# Patient Record
(demographics unavailable — no encounter records)

---

## 2024-11-13 NOTE — REVIEW OF SYSTEMS
[Leg Claudication (leg pain with exertion)] : intermittent leg claudication [Lower Ext Edema (lower leg swelling)] : lower extremity edema [Heartburn] : heartburn [Melena (black stool)] : melena [Negative] : Heme/Lymph

## 2024-11-15 NOTE — PHYSICAL EXAM
[Alert] : alert [Normal Voice/Communication] : normal voice/communication [Healthy Appearing] : healthy appearing [No Acute Distress] : no acute distress [Sclera] : the sclera and conjunctiva were normal [Normal Appearance] : the appearance of the neck was normal [No Neck Mass] : no neck mass was observed [No Respiratory Distress] : no respiratory distress [Respiration, Rhythm And Depth] : normal respiratory rhythm and effort [Auscultation Breath Sounds / Voice Sounds] : lungs were clear to auscultation bilaterally [Heart Rate And Rhythm] : heart rate was normal and rhythm regular [Normal S1, S2] : normal S1 and S2 [Murmurs] : no murmurs [Bowel Sounds] : normal bowel sounds [Abdomen Tenderness] : non-tender [No Masses] : no abdominal mass palpated [Abdomen Soft] : soft [] : no hepatosplenomegaly [Normal Color / Pigmentation] : normal skin color and pigmentation [de-identified] : AMbulates with a cane

## 2024-11-15 NOTE — PHYSICAL EXAM
[Alert] : alert [Normal Voice/Communication] : normal voice/communication [Healthy Appearing] : healthy appearing [No Acute Distress] : no acute distress [Sclera] : the sclera and conjunctiva were normal [Normal Appearance] : the appearance of the neck was normal [No Neck Mass] : no neck mass was observed [No Respiratory Distress] : no respiratory distress [Respiration, Rhythm And Depth] : normal respiratory rhythm and effort [Auscultation Breath Sounds / Voice Sounds] : lungs were clear to auscultation bilaterally [Heart Rate And Rhythm] : heart rate was normal and rhythm regular [Normal S1, S2] : normal S1 and S2 [Murmurs] : no murmurs [Bowel Sounds] : normal bowel sounds [Abdomen Tenderness] : non-tender [No Masses] : no abdominal mass palpated [Abdomen Soft] : soft [] : no hepatosplenomegaly [Normal Color / Pigmentation] : normal skin color and pigmentation [de-identified] : AMbulates with a cane

## 2024-11-15 NOTE — ASSESSMENT
[FreeTextEntry1] : Anemia, iron deficiency - Prior w/u of her anemia has not revealed a GI cause. Last evaluation was done in 2020 and now she has had recent decline in iron stores resulting in iron infusions. She has responded appropriately to iron infusions. I reviewed the risks and benefits of doing an EGD and colonoscopy at this time. She has a prior history of colon polyps and PUD. She has agreed to have these done, so will schedule for her to have these done. Will need PST prior to these procedures. Golytely prep.

## 2024-11-19 NOTE — ASSESSMENT
[FreeTextEntry1] : Bilateral simple/complex renal cyst, stable on US. Follow up in one year with repeat renal US.

## 2024-11-19 NOTE — HISTORY OF PRESENT ILLNESS
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  Office renal US today: Findings: No recurrence of any masses bilaterally. There is a 3.4 x 2.9 x 3.9cm cyst mid pole and 1.5 x 1.2 x 1.5cm upper pole cyst in the right kidney. Additionally, there is a 1.8 x 1.7 x 2.2cm septate cyst in the lower pole of the right kidney. There is a 2.3 x 2.1 x 2.4cm upper pole and 1.1 x 0.8 x 1cm lower pole cyst in the left kidney. A septate cyst was also seen in the mid pole of the left kidney measuring 1.1 x 1.2 x 1.9cm. All cysts appear nonvascular bilaterally. Both kidneys are normal in size and echogenicity without hydronephrosis, stones or solid masses present.  Last creatinine 1.20 mg/dL, Oct 2024. Urinary function: no hematuria, dysuria.  No incontinence.

## 2025-01-14 NOTE — ASSESSMENT
[FreeTextEntry1] : This is a 72 year old woman with a history of anemia, MGUS, was in hospital for a laminectomy in July 25th. patient hg at baseline prior to surgery was in the high 9's decreased to 7.5g/dol was transfused a unit of PRBC during the surgery.  Since then had never been severely anemic but HG was previously in the 10-11 range, this year more in the 9.5-10 range with a slight decrees. Iron studies remain borderline % sat 22%, unclear how much iron deficiencies she has, appears to have a small element of renal incapacity and anemia of chronic disease.  Discussed possible iron infusions, however at the end of the visit determent that this is less likely to help and patient is asymptomatic therefore we could continue observation Q 6 months.

## 2025-01-14 NOTE — HISTORY OF PRESENT ILLNESS
[de-identified] : Rosaura Huang is a 72 year old woman with BIBI on CPAP, gout and arthritis, type 2 diabetes mellitus, hyperlipidemia, esophageal reflux, hypertension, hypothyroidism, lumbar radiculopathy, spondylolisthesis, who is referred for anemia and H/O PUD. She had EGD and colonoscopy in September 2013 and has H/O PUD and internal hemorrhoids. On 6/6/16, hemoglobin was 9.6 g/dL and ferritin 12.9, L. On 2/2/17, hemoglobin dropped to 8.0 g/dL, platelet count was 433, and iron saturation was 5%. She had erythropoietin level of 68 on 7/20/17. Hemoglobin was 8.7 g/dL on 8/25/17. [de-identified] : Hg9.5g/dl. Patient feeling ok, no weakness no SOB, Hg has been 9-10 for the past year or so, this is just about here  baseline considered IV iron but after some discussion with ehr, the iron deficiency portion of the anemia is rather inconclusive, probably some amount of anemia of chronic disease and renal insufficiency involved so the iron may not work. Can continue to follow rather than intervene right ow, patient can not tolerate oral supplementation.   Patient is still on the B12 for the B12 deficiency, once a day, B12 has been well over 2000 and this had not hanged in years.

## 2025-01-14 NOTE — HISTORY OF PRESENT ILLNESS
[de-identified] : Rosaura Huang is a 72 year old woman with BIBI on CPAP, gout and arthritis, type 2 diabetes mellitus, hyperlipidemia, esophageal reflux, hypertension, hypothyroidism, lumbar radiculopathy, spondylolisthesis, who is referred for anemia and H/O PUD. She had EGD and colonoscopy in September 2013 and has H/O PUD and internal hemorrhoids. On 6/6/16, hemoglobin was 9.6 g/dL and ferritin 12.9, L. On 2/2/17, hemoglobin dropped to 8.0 g/dL, platelet count was 433, and iron saturation was 5%. She had erythropoietin level of 68 on 7/20/17. Hemoglobin was 8.7 g/dL on 8/25/17. [de-identified] : Hg9.5g/dl. Patient feeling ok, no weakness no SOB, Hg has been 9-10 for the past year or so, this is just about here  baseline considered IV iron but after some discussion with ehr, the iron deficiency portion of the anemia is rather inconclusive, probably some amount of anemia of chronic disease and renal insufficiency involved so the iron may not work. Can continue to follow rather than intervene right ow, patient can not tolerate oral supplementation.   Patient is still on the B12 for the B12 deficiency, once a day, B12 has been well over 2000 and this had not hanged in years.

## 2025-01-14 NOTE — HISTORY OF PRESENT ILLNESS
[de-identified] : Rosaura Huang is a 72 year old woman with BIBI on CPAP, gout and arthritis, type 2 diabetes mellitus, hyperlipidemia, esophageal reflux, hypertension, hypothyroidism, lumbar radiculopathy, spondylolisthesis, who is referred for anemia and H/O PUD. She had EGD and colonoscopy in September 2013 and has H/O PUD and internal hemorrhoids. On 6/6/16, hemoglobin was 9.6 g/dL and ferritin 12.9, L. On 2/2/17, hemoglobin dropped to 8.0 g/dL, platelet count was 433, and iron saturation was 5%. She had erythropoietin level of 68 on 7/20/17. Hemoglobin was 8.7 g/dL on 8/25/17. [de-identified] : Hg9.5g/dl. Patient feeling ok, no weakness no SOB, Hg has been 9-10 for the past year or so, this is just about here  baseline considered IV iron but after some discussion with ehr, the iron deficiency portion of the anemia is rather inconclusive, probably some amount of anemia of chronic disease and renal insufficiency involved so the iron may not work. Can continue to follow rather than intervene right ow, patient can not tolerate oral supplementation.   Patient is still on the B12 for the B12 deficiency, once a day, B12 has been well over 2000 and this had not hanged in years.

## 2025-01-14 NOTE — REASON FOR VISIT
[Follow-Up Visit] : a follow-up visit for
Yes

## 2025-02-25 NOTE — HISTORY OF PRESENT ILLNESS
[FreeTextEntry1] : Rosaura is scheduled to undergo panendoscopy with Dr. Lyudmila Beverly at [facility] on 3/10/2025.  However, recently, she has noted nearly predictable nausea in the evenings, lasting until about 10-11AM.  She is more likely to be symptomatic after ingesting oily or spicy foods.  Abdominal ultrasound several years ago revealed cholelithiasis and fatty liver.  She is chronically anemic, worse lately, with component of iron deficiency she has had type 2 diabetes mellitus for approximately 20 years.  She was previously taking famotidine and/or pantoprazole, has not used for quite some time.

## 2025-02-25 NOTE — REASON FOR VISIT
[Consultation] : a consultation visit [Other: _____] : [unfilled] [FreeTextEntry1] : pre-colonoscopy and endoscopy, feel nauseous and Gerd

## 2025-02-25 NOTE — CONSULT LETTER
[Dear  ___] : Dear  [unfilled], [Courtesy Letter:] : I had the pleasure of seeing your patient, [unfilled], in my office today. [Please see my note below.] : Please see my note below. [Consult Closing:] : Thank you very much for allowing me to participate in the care of this patient.  If you have any questions, please do not hesitate to contact me. [Sincerely,] : Sincerely, [DrNidia  ___] : Dr. BELTRAN [FreeTextEntry3] : Gomez Reno M.D.

## 2025-02-25 NOTE — PHYSICAL EXAM
[Alert] : alert [No Acute Distress] : no acute distress [Well Developed] : well developed [Well Nourished] : well nourished [Obese (BMI >= 30)] : obese (BMI >= 30) [None] : no edema [Bowel Sounds] : normal bowel sounds [Abdomen Tenderness] : non-tender [No Masses] : no abdominal mass palpated [Abdomen Soft] : soft [] : no hepatosplenomegaly [Inguinal Lymph Nodes Enlarged Bilaterally] : no inguinal lymphadenopathy [Normal Color / Pigmentation] : normal skin color and pigmentation [Normal] : oriented to person, place, and time [de-identified] : surgical scars

## 2025-02-25 NOTE — ASSESSMENT
[FreeTextEntry1] : 1.  Nausea, early satiety, with known cholelithiasis and fatty liver--possible symptomatic cholelithiasis.  Possible recurrent ulcer.  Suspect gastroparesis (predisposed because of long-standing DM and thyroid disease). 2.  Chronic anemia with iron deficiency, MGUS--rule out GI source of blood loss.  History of colonic polyps and peptic ulcer. 3.  Obesity. 4.  Type 2 diabetes mellitus. 5.  Hypothyroidism. 6.  Obstructive sleep apnea. 7.  Osteoarthritis. 8.  Status post partial nephrectomy, spinal surgery, hysterectomy, , hemorrhoidectomy, turbinectomy. 9.  Allergic to latex, penicillin.  Plan: 1.  Medical records reviewed. 2.  Try ondansetron 8 mg daily in the evenings.  Can increase to as much as 8 mg AC 3 times daily, particularly in the 24-48 hours prior to contemplated colonoscopy. 3.  Repeat abdominal ultrasound. 4.  Proceed with panendoscopy as scheduled. Procedures, rationale, anesthesia plan, and PEG prep instructions were again reviewed and brochures have been given.

## 2025-03-05 NOTE — DISCUSSION/SUMMARY
[de-identified] : We discussed further treatment options. Overall, she is doing well. Restrictions were reviewed. Follow up in one year.

## 2025-03-05 NOTE — ADDENDUM
[FreeTextEntry1] : I, Yovany Thomas, documented this note as a scribe on behalf of Amilcar Ya MD on 03/05/2025.

## 2025-03-05 NOTE — END OF VISIT
[Time Spent: ___ minutes] : I have spent [unfilled] minutes of time on the encounter which excludes teaching and separately reported services. [FreeTextEntry3] : All medical record entries made by the Scribe were at my, Amilcar Ya MD, direction and personally dictated by me on 03/05/2025. I have reviewed the chart and agree that the record accurately reflects my personal performance of the history, physical exam, assessment and plan. I have also personally directed, reviewed, and agreed with the chart.

## 2025-03-05 NOTE — HISTORY OF PRESENT ILLNESS
[de-identified] : Ms. MAMTA MADSEN  is a 71 year old female who presents to the office s/p L5-S1 laminectomy/fusion on 7/25/23.  She complains of low back pain.  Standing is difficult. States she is ambulating with a walker/cane. Her neurologist gives her tramadol and lyrical.

## 2025-03-05 NOTE — PHYSICAL EXAM
[Cane] : ambulates with cane [de-identified] : Incision is healed.  Stable neurologic exam. [de-identified] : AP lateral lumbar x-rays reveals instrumented L5-S1 fusion.  She does appear to have some slightly increased L4-5 spondylolisthesis.  Stable compared to prior x-rays.

## 2025-03-30 NOTE — HISTORY OF PRESENT ILLNESS
[FreeTextEntry1] : 72 y/o woman with history of anemia, MGUS, BIBI on CPAP, gout and arthritis, type 2 diabetes mellitus, hyperlipidemia, hypertension, hypothyroidism, lumbar radiculopathy, spondylolisthesis, who presents for follow up - see other GI DrNidia Beverly who arranged for EGD/colonoscopy for anemia, Seen GI Dr. Gomez Reno as well after my last visit with her in 3/2024.      Initial office visit 3/2024:  She used to have heartburn -Takes famotidine and pantoprazole since many years - if she does not take it she can't eat. Patient denies having heartburn, regurgitation, difficulty swallowing, painful swallowing, nausea, vomiting.  Has to clean a lot after a BM - says hemorrhoidectomy in 2021 perhaps created a problem - Has BM twice a day or once a day. Has generalized abdominal pain 7/10 on pain scale - on occasion - mainly in the morning. She has lost 4 lbs since 3 months - because she has been eating healtierh.  Patient denies having constipation, diarrhea, melena and hematochezia. Patient denies family history of colon polyp and gastrointestinal cancers.

## 2025-03-30 NOTE — ASSESSMENT
[FreeTextEntry1] : IMPRESSION: # History in past for heartburn -no heartburn while on famotidine and pantoprazole since many years - - EGD by Dr. Ag on 9/2020: Nml esophagus. Moderate gastric erythema s/p bx (neg for HP and IM). Few diminutive gastric polyps s/p bx (Fundic gland polyp). Nml duodenum s/p bx (neg celiac). - EGD by DR. Ag 11/2017: Nml esophagus. Gastric erythema s/p bx (neg for HP and IM). Nml duodenum s/p bx (neg for celiac disease).  # Low risk adenoma - Has to clean a lot after a BM - says hemorrhoidectomy in 2021 perhaps created a problem - - Has generalized abdominal pain 7/10 on pain scale - on occasion - mainly in the morning. - Colonoscopy by Dr. Ag 9/2020: 7 mm Ascending colon polyp removed (TA), Hemorrhoids. Rpt 5 yrs. - Colonoscopy by Dr. Ag 11/2017: sigmoid polyp removed (TA). - Colonoscopy by Dr. Hernandez 2013: No polyps - rpt 10 yrs. - Patient denies family history of colon polyp and gastrointestinal cancers.  #  Fatty liver, gallstone -  Abdominal ultrasound on 2/2025:  Fatty liver.  1.3 cm gallstone   # History of iron deficiency anemia - follows with hematology -  H/H of 9.3/28.9 on 1/2025  - Seen colorectal surgeon for hemorrhoid banding in 1/2021 - Capsule endoscopy on 11/2020: Nml small bowel.   # Comorbidities:  MGUS, BIBI on CPAP, gout and arthritis, type 2 diabetes mellitus, hyperlipidemia, hypertension, hypothyroidism, lumbar radiculopathy, spondylolisthesis   PLAN: PPI use is associated with adverse events in all organ systems in the body - however association is not causation. Adverse events are based on observation studies and no randomized control trials.  PPI may well be associated with increase risk for enteric infections.  PPI safety is certain in diseases of concern: Pneumonia, Cardiovascular disease, All cause mortality, Fracture  PPI safety is less clear in diseases of concern: Stroke, dementia, Renal disease, Inflammatory bowel disease outcome  Can ween off Pantoprazole, then can take Famotidine as needed.   Discussed a colonoscopy to rule out colon polyps, colorectal cancer etc. under monitored anesthesia care. Risks such as perforation requiring surgery, colostomy, bleeding requiring blood transfusion, infection/sepsis, diverticulitis, colitis, missed colon cancer (2% to 6%), internal organ injury such as splenic hemorrhage (1/6000, risk thin, female gender) etc, adverse reaction to medication etc. and risks of anesthesia including cardiopulmonary compromise requiring ICU care were discussed with patient. Alternatives were discussed (CT colonography, stool test etc). Patient verbalized understanding and she would like to hold off on further test at the moment.  She will follow up as needed.

## 2025-05-11 NOTE — ASSESSMENT
[FreeTextEntry1] : This is a 73 year old woman with a history of anemia, MGUS, was in hospital for a laminectomy in July 25th. patient hg at baseline prior to surgery was in the high 9's decreased to 7.5g/dol was transfused a unit of PRBC during the surgery.  Since then had never been severely anemic but HG was previously in the 10-11 range, this year more in the 9.5-10 range with a slight decrees. Hg seems to have decreased more than usual today, Hg 9.3/gl while the creatinine has improved, does not apepar to be worsening renal insuffeicny . Recomend patient proceed to bone marrow ibpsy.     Had endoscopy on Monday coming up. Still reccomedn doing this given pateint had an iron defeicny at [facility] though it does not apeapr active right now.  Discuss deborah's jhistroy of slepe apnea thoug hthis woudl not cause an anemia.   Reccomended patient proceed to bone marrow biopsy next week.   Spent > 40 minutes in direct patient care and and addressed all questions and concerns.  >50% of this time was in direct face to face contact with patient during exam and counseling.

## 2025-05-11 NOTE — HISTORY OF PRESENT ILLNESS
[de-identified] : Hg continues to decline slightly, at Nephorlogist Dr Cox office, Hg was 10.3g/dl 2 weeks ago but today 8.9g/dl today.  Patinet reports some weakness and dizziness. Hg 9.3g/dl today.   B13, folic acid were normal, above reference ranges, Ferritin 160 does not appear to be iron defacing any longer.   As of January.    SPEP in oct 2024 showed a mild light chain ratio 2.37 does not appear to be myeloma/.     Creatinine had been high 1.3 to 1.4 range but more recently improved to 1.2   Had endoscopy on Monday coming up.   Anemia does not appear to be iron decency Ane longer, normal MCV and iron studies.   Patient does also have sleep apnea, but I explaiend that htis would not cause the anemia.   Reccomended patient proceed to bone marrow biopsy next week.

## 2025-05-11 NOTE — HISTORY OF PRESENT ILLNESS
[de-identified] : Hg continues to decline slightly, at Nephorlogist Dr Cox office, Hg was 10.3g/dl 2 weeks ago but today 8.9g/dl today.  Patinet reports some weakness and dizziness. Hg 9.3g/dl today.   B13, folic acid were normal, above reference ranges, Ferritin 160 does not appear to be iron defacing any longer.   As of January.    SPEP in oct 2024 showed a mild light chain ratio 2.37 does not appear to be myeloma/.     Creatinine had been high 1.3 to 1.4 range but more recently improved to 1.2   Had endoscopy on Monday coming up.   Anemia does not appear to be iron decency Ane longer, normal MCV and iron studies.   Patient does also have sleep apnea, but I explaiend that htis would not cause the anemia.   Reccomended patient proceed to bone marrow biopsy next week.

## 2025-05-11 NOTE — ASSESSMENT
[FreeTextEntry1] : This is a 73 year old woman with a history of anemia, MGUS, was in hospital for a laminectomy in July 25th. patient hg at baseline prior to surgery was in the high 9's decreased to 7.5g/dol was transfused a unit of PRBC during the surgery.  Since then had never been severely anemic but HG was previously in the 10-11 range, this year more in the 9.5-10 range with a slight decrees. Hg seems to have decreased more than usual today, Hg 9.3/gl while the creatinine has improved, does not apepar to be worsening renal insuffeicny . Recomend patient proceed to bone marrow ibpsy.     Had endoscopy on Monday coming up. Still reccomedn doing this given pateint had an iron defeicny at [facility] though it does not apeapr active right now.  Discuss deborah's jhistroy of slepe apnea thoug hthis woudl not cause an anemia.   Reccomended patient proceed to bone marrow biopsy next week.   Spent > 40 minutes in direct patient care and and addressed all questions and concerns.  >50% of this time was in direct face to face contact with patient during exam and counseling.

## 2025-05-14 NOTE — REVIEW OF SYSTEMS
[Fatigue] : fatigue [Obesity] : obesity [Thyroid Disease] : thyroid disease [Diabetes] : diabetes  [History of Iron Deficiency] : history of iron deficiency [Negative] : Psychiatric

## 2025-05-14 NOTE — ASSESSMENT
[FreeTextEntry1] : Ms. Huang is 73-year-old female with moderate BIBI on CPAP therapy who presents to the office for follow up.  PMHx: HTN, DMT2, esophageal reflux/GERD, Gout, BRANDON, Hypothyroidism, s/p resection of benign kidney mass  Discussed with patient CPAP compliance data: Compliant 31% of days, 26% for >4-hrs, average 5hrs 7mins. AHI 5.7/hr Leaks 7.6L/m. Patient is using and benefitting from CPAP therapy.  Patient to update provider after using new mask in 2 weeks. Will order supplies at that time.   Annual follow-up, sooner if needed.

## 2025-05-14 NOTE — HISTORY OF PRESENT ILLNESS
[To Bed: ___] : ~he/she~ goes to bed at [unfilled] [Arises: ___] : arises at [unfilled] [Sleep Onset Latency: ___ minutes] : sleep onset latency of [unfilled] minutes reported [Nocturnal Awakenings: ___] : ~he/she~ typically has [unfilled] nocturnal awakenings [FreeTextEntry1] : Ms. Huang is 73-year-old female with moderate BIBI on CPAP therapy who presents to the office for follow up.  PMHx: HTN, DMT2, esophageal reflux/GERD, Gout, BRANDON, Hypothyroidism, s/p resection of benign kidney mass  BIBI was initially diagnosed in ; with latest PSG in  revealing an AHI of 27.4/hr, with a T 90 of 23.6%. Latest titration study from  showed an optimal pressure of 14 cmH20 with a full face mask.. Also, has a hx of MSLT with SOL 5.6 minutes without SOREMS-- was on Nuvigil in  for EDS but discontinued it due to side effects.  Patient reports she is trying to use CPAP regularly. She had break in usage due to death of family member and moving.  She states when she uses CPAP she feels benefit. Patient reports she can't sleep without the device.  Patient is current using FFM, which she states is very uncomfortable. She had a mask fitting today in office and will trial new mask for 2 weeks.   no interim changes in weight, medication or medical condition.   EPWORTH SLEEPINESS SCALE How likely are you to doze off or fall asleep in the situations described below, in contrast to feeling just tired? This refers to your usual way of life in recent times. Even if you haven't done some of these things recently, try to work out how they would have affected you. Use the following scale to choose one most appropriate number for each situation.......Chance of dozin= never. 1= slight. 2= moderate. 3= high. CHANCE OF DOZING............SITUATION 1.............................................Sitting and reading 1.............................................Watching TV 2.............................................Sitting inactive in a public place (eg a theatre or a meeting) 1.............................................As a passenger in a car for an hour without a break 3.............................................Lying down to rest in the afternoon when circumstances permit 0.............................................Sitting and talking to someone 1.............................................Sitting quietly after lunch without alcohol 0.............................................In a car, while stopped for a few minutes in traffic  9............................................TOTAL ESS SCORE

## 2025-05-14 NOTE — PROCEDURE
[FreeTextEntry1] : Maskfitting Patient is currently using a ResMed F20 full face mask, size Medium. Patient was fit with a [employer] Vitera full face mask, size Medium.  MAMTA MADSEN was comfortable with the fit. Patient will try this mask and follow up with us within 2 weeks.  Patient uses [employer].

## 2025-06-02 NOTE — PROCEDURE
[Bone Marrow Biopsy] : bone marrow biopsy [Bone Marrow Aspiration] : bone marrow aspiration  [Patient] : the patient [Verbal Consent Obtained] : verbal consent was obtained prior to the procedure [Patient identification verified] : patient identification verified [Procedure verified and consent obtained] : procedure verified and consent obtained [Laterality verified and correct site marked] : laterality verified and correct site marked [Left] : site: left [Correct positioning] : correct positioning [Prone] : prone [Superior iliac spine was identified] : the superior iliac spine was identified. [The left posterior iliac crest was prepped with betadine and draped, using sterile technique.] : The left posterior iliac crest was prepped with betadine and draped, using sterile technique. [Lidocaine was injected and into the periosteum overlying the site.] : Lidocaine was injected and into the periosteum overlying the site. [Aspirate] : aspirate [Cytogenetics] : cytogenetics [FISH] : FISH [Biopsy] : biopsy [Flow Cytometry] : flow cytometry [] : The patient was instructed to remove the bandage the following AM. The patient may bathe. Acetaminophen may be taken for discomfort, as per package directions.If there are any other problems, the patient was instructed to call the office. The patient verbalized understanding, and is aware of the office contact numbers. [FreeTextEntry1] : Anemia and elevated FLC ratio [FreeTextEntry2] : 10 cc of lidocaine was used for the procedure.   WBC: 8.54 Hgb: 9.3 Hct: 29.2 Plts: 144  Bone marrow aspiration and biopsy were done. MDS, multiple myeloma panel and onkosight myeloid panel requested

## 2025-06-02 NOTE — REASON FOR VISIT
[Bone Marrow Biopsy] : bone marrow biopsy [Bone Marrow Aspiration] : bone marrow aspiration [FreeTextEntry2] : Anemia and elevated FLC ratio

## 2025-06-25 NOTE — REVIEW OF SYSTEMS
[Feeling Poorly] : feeling poorly [Feeling Tired] : feeling tired [Heart Rate Is Slow] : slow heart rate [Lower Ext Edema (lower leg swelling)] : lower extremity edema [Vomiting] : vomiting [Constipation] : constipation [Muscle Weakness] : muscle weakness [Negative] : Heme/Lymph

## 2025-06-25 NOTE — PHYSICAL EXAM
[Alert] : alert [No Acute Distress] : no acute distress [Sclera] : the sclera and conjunctiva were normal [Normal Appearance] : the appearance of the neck was normal [No Neck Mass] : no neck mass was observed [Respiration, Rhythm And Depth] : normal respiratory rhythm and effort [Auscultation Breath Sounds / Voice Sounds] : lungs were clear to auscultation bilaterally [Heart Rate And Rhythm] : heart rate was normal and rhythm regular [Normal S1, S2] : normal S1 and S2 [Murmurs] : no murmurs [Bowel Sounds] : normal bowel sounds [Abdomen Tenderness] : non-tender [No Masses] : no abdominal mass palpated [Abdomen Soft] : soft [] : no hepatosplenomegaly [Normal Color / Pigmentation] : normal skin color and pigmentation

## 2025-06-25 NOTE — ASSESSMENT
[FreeTextEntry1] : Nausea, vomiting, post-prandial bloating : The patient has symptoms of delayed gastric emptying, consisting of nausea, and vomiting food consumed days prior. This is likely a complication of her long-standing diabetes. EGD done 5/2025 showed no outlet obtruction.  - Prescribed metoclopramide (Reglan) 5mg to be taken before each meal and at bedtime (4 times daily) as needed. I discussed the possible risk of tardive dyskinesia with the patient and the medication has a black box warning in this regard. Recommended pureeing foods and consuming liquid-based meals (soups, puddings, yogurt) to aid digestion on days when she is symptomatic. - Follow-up appointment in 3-4 weeks to assess response to treatment  - Constipation : The patient reports infrequent bowel movements, straining, and difficulty passing stool. - Recommended over-the-counter bisacodyl (Dulcolax) tablets, 2 tablets at bedtime. Advised to adjust dosage if experiencing cramps or diarrhea  Total time spent caring for the patient today was 25 minutes. This includes the time spent before the visit reviewing the chart, time spent during the visit and time spent after the visit on documentation and coordination of care.

## 2025-06-25 NOTE — REASON FOR VISIT
[Follow-up] : a follow-up of an existing diagnosis [Formal Caregiver] : formal caregiver [FreeTextEntry1] : feels like she cannot digest her food good

## 2025-07-23 NOTE — REASON FOR VISIT
[Follow-up] : a follow-up of an existing diagnosis [FreeTextEntry1] : nausea, discomfort, reduced appetite

## 2025-07-23 NOTE — ASSESSMENT
[FreeTextEntry1] : Gastroesophageal Reflux Disease (GERD): Patient's symptoms are consistent with GERD, possibly exacerbated by delayed gastric emptying related to longstanding diabetes. Current treatment with famotidine may be insufficient. - Discontinue famotidine  Post-prandial nausea and vomiting: Possibly related to longstanding diabetes, causing delayed gastric emptying  - Continue Metoclopramide 5mg, twice daily before meals  Cholelithiasis: Reviewed her most recent RUQ ultrasound with her at her request. 1.3-centimeter gallstone found on recent ultrasound, currently asymptomatic. - No intervention required at this time   Total time spent caring for the patient today was 25 minutes. This includes the time spent before the visit reviewing the chart, time spent during and after the visit on documentation, patient education, and coordination of care.

## 2025-07-23 NOTE — HISTORY OF PRESENT ILLNESS
[de-identified] : May 2025 [FreeTextEntry1] : May 2025 [de-identified] : Feb 2025: 1.3 cm gallstone

## 2025-07-23 NOTE — PHYSICAL EXAM
[Alert] : alert [Normal Voice/Communication] : normal voice/communication [No Acute Distress] : no acute distress [No Respiratory Distress] : no respiratory distress [Respiration, Rhythm And Depth] : normal respiratory rhythm and effort [Bowel Sounds] : normal bowel sounds [Abdomen Tenderness] : non-tender [No Masses] : no abdominal mass palpated [Abdomen Soft] : soft [] : no hepatosplenomegaly [Normal Color / Pigmentation] : normal skin color and pigmentation